# Patient Record
Sex: FEMALE | Race: WHITE | NOT HISPANIC OR LATINO
[De-identification: names, ages, dates, MRNs, and addresses within clinical notes are randomized per-mention and may not be internally consistent; named-entity substitution may affect disease eponyms.]

---

## 2017-08-03 ENCOUNTER — LABORATORY RESULT (OUTPATIENT)
Age: 82
End: 2017-08-03

## 2017-08-03 ENCOUNTER — APPOINTMENT (OUTPATIENT)
Dept: ENDOCRINOLOGY | Facility: CLINIC | Age: 82
End: 2017-08-03
Payer: MEDICARE

## 2017-08-03 VITALS
WEIGHT: 100 LBS | HEIGHT: 58.5 IN | SYSTOLIC BLOOD PRESSURE: 112 MMHG | HEART RATE: 74 BPM | DIASTOLIC BLOOD PRESSURE: 68 MMHG | BODY MASS INDEX: 20.43 KG/M2

## 2017-08-03 DIAGNOSIS — E04.2 NONTOXIC MULTINODULAR GOITER: ICD-10-CM

## 2017-08-03 DIAGNOSIS — E78.5 HYPERLIPIDEMIA, UNSPECIFIED: ICD-10-CM

## 2017-08-03 PROCEDURE — 36415 COLL VENOUS BLD VENIPUNCTURE: CPT

## 2017-08-03 PROCEDURE — 99214 OFFICE O/P EST MOD 30 MIN: CPT | Mod: 25

## 2017-08-03 RX ORDER — AMOXICILLIN AND CLAVULANATE POTASSIUM 500; 125 MG/1; MG/1
500-125 TABLET, FILM COATED ORAL
Qty: 20 | Refills: 0 | Status: ACTIVE | COMMUNITY
Start: 2017-07-10

## 2017-08-03 RX ORDER — TRAMADOL HYDROCHLORIDE 50 MG/1
50 TABLET, COATED ORAL
Qty: 120 | Refills: 0 | Status: ACTIVE | COMMUNITY
Start: 2017-02-15

## 2017-08-03 RX ORDER — NUT.TX.IMPAIRED DIGESTIVE FXN 0.035-1/ML
LIQUID (ML) ORAL
Refills: 0 | Status: ACTIVE | COMMUNITY

## 2017-08-03 RX ORDER — CLARITHROMYCIN 500 MG/1
500 TABLET, FILM COATED ORAL
Qty: 20 | Refills: 0 | Status: ACTIVE | COMMUNITY
Start: 2017-07-17

## 2017-08-08 LAB
T3 SERPL-MCNC: 107 NG/DL
T4 SERPL-MCNC: 9.1 UG/DL
TSH SERPL-ACNC: 0.6 UIU/ML

## 2018-02-13 ENCOUNTER — INPATIENT (INPATIENT)
Facility: HOSPITAL | Age: 83
LOS: 2 days | Discharge: NURSING, REHAB OR LONG-TERM CARE | End: 2018-02-16
Attending: HOSPITALIST | Admitting: HOSPITALIST
Payer: MEDICARE

## 2018-02-13 VITALS
RESPIRATION RATE: 18 BRPM | SYSTOLIC BLOOD PRESSURE: 121 MMHG | DIASTOLIC BLOOD PRESSURE: 68 MMHG | TEMPERATURE: 98 F | OXYGEN SATURATION: 97 % | HEART RATE: 74 BPM

## 2018-02-13 DIAGNOSIS — S32.591A OTHER SPECIFIED FRACTURE OF RIGHT PUBIS, INITIAL ENCOUNTER FOR CLOSED FRACTURE: ICD-10-CM

## 2018-02-13 LAB
ALBUMIN SERPL ELPH-MCNC: 4.7 G/DL — SIGNIFICANT CHANGE UP (ref 3.3–5)
ALP SERPL-CCNC: 51 U/L — SIGNIFICANT CHANGE UP (ref 40–120)
ALT FLD-CCNC: 24 U/L — SIGNIFICANT CHANGE UP (ref 4–33)
APPEARANCE UR: CLEAR — SIGNIFICANT CHANGE UP
APTT BLD: 29.7 SEC — SIGNIFICANT CHANGE UP (ref 27.5–37.4)
AST SERPL-CCNC: 28 U/L — SIGNIFICANT CHANGE UP (ref 4–32)
BASOPHILS # BLD AUTO: 0.05 K/UL — SIGNIFICANT CHANGE UP (ref 0–0.2)
BASOPHILS NFR BLD AUTO: 0.3 % — SIGNIFICANT CHANGE UP (ref 0–2)
BILIRUB SERPL-MCNC: 1.6 MG/DL — HIGH (ref 0.2–1.2)
BILIRUB UR-MCNC: NEGATIVE — SIGNIFICANT CHANGE UP
BLD GP AB SCN SERPL QL: NEGATIVE — SIGNIFICANT CHANGE UP
BLOOD UR QL VISUAL: NEGATIVE — SIGNIFICANT CHANGE UP
BUN SERPL-MCNC: 21 MG/DL — SIGNIFICANT CHANGE UP (ref 7–23)
CALCIUM SERPL-MCNC: 9 MG/DL — SIGNIFICANT CHANGE UP (ref 8.4–10.5)
CHLORIDE SERPL-SCNC: 101 MMOL/L — SIGNIFICANT CHANGE UP (ref 98–107)
CO2 SERPL-SCNC: 27 MMOL/L — SIGNIFICANT CHANGE UP (ref 22–31)
COLOR SPEC: SIGNIFICANT CHANGE UP
CREAT SERPL-MCNC: 0.97 MG/DL — SIGNIFICANT CHANGE UP (ref 0.5–1.3)
EOSINOPHIL # BLD AUTO: 0.03 K/UL — SIGNIFICANT CHANGE UP (ref 0–0.5)
EOSINOPHIL NFR BLD AUTO: 0.2 % — SIGNIFICANT CHANGE UP (ref 0–6)
GLUCOSE SERPL-MCNC: 114 MG/DL — HIGH (ref 70–99)
GLUCOSE UR-MCNC: NEGATIVE — SIGNIFICANT CHANGE UP
HCT VFR BLD CALC: 45.1 % — HIGH (ref 34.5–45)
HGB BLD-MCNC: 15.1 G/DL — SIGNIFICANT CHANGE UP (ref 11.5–15.5)
IMM GRANULOCYTES # BLD AUTO: 0.1 # — SIGNIFICANT CHANGE UP
IMM GRANULOCYTES NFR BLD AUTO: 0.6 % — SIGNIFICANT CHANGE UP (ref 0–1.5)
INR BLD: 0.94 — SIGNIFICANT CHANGE UP (ref 0.88–1.17)
KETONES UR-MCNC: SIGNIFICANT CHANGE UP
LEUKOCYTE ESTERASE UR-ACNC: SIGNIFICANT CHANGE UP
LYMPHOCYTES # BLD AUTO: 1.79 K/UL — SIGNIFICANT CHANGE UP (ref 1–3.3)
LYMPHOCYTES # BLD AUTO: 11.4 % — LOW (ref 13–44)
MCHC RBC-ENTMCNC: 33 PG — SIGNIFICANT CHANGE UP (ref 27–34)
MCHC RBC-ENTMCNC: 33.5 % — SIGNIFICANT CHANGE UP (ref 32–36)
MCV RBC AUTO: 98.7 FL — SIGNIFICANT CHANGE UP (ref 80–100)
MONOCYTES # BLD AUTO: 1.13 K/UL — HIGH (ref 0–0.9)
MONOCYTES NFR BLD AUTO: 7.2 % — SIGNIFICANT CHANGE UP (ref 2–14)
MUCOUS THREADS # UR AUTO: SIGNIFICANT CHANGE UP
NEUTROPHILS # BLD AUTO: 12.65 K/UL — HIGH (ref 1.8–7.4)
NEUTROPHILS NFR BLD AUTO: 80.3 % — HIGH (ref 43–77)
NITRITE UR-MCNC: NEGATIVE — SIGNIFICANT CHANGE UP
NON-SQ EPI CELLS # UR AUTO: <1 — SIGNIFICANT CHANGE UP
NRBC # FLD: 0 — SIGNIFICANT CHANGE UP
PH UR: 6.5 — SIGNIFICANT CHANGE UP (ref 4.6–8)
PLATELET # BLD AUTO: 204 K/UL — SIGNIFICANT CHANGE UP (ref 150–400)
PMV BLD: 10 FL — SIGNIFICANT CHANGE UP (ref 7–13)
POTASSIUM SERPL-MCNC: 4 MMOL/L — SIGNIFICANT CHANGE UP (ref 3.5–5.3)
POTASSIUM SERPL-SCNC: 4 MMOL/L — SIGNIFICANT CHANGE UP (ref 3.5–5.3)
PROT SERPL-MCNC: 7.2 G/DL — SIGNIFICANT CHANGE UP (ref 6–8.3)
PROT UR-MCNC: NEGATIVE MG/DL — SIGNIFICANT CHANGE UP
PROTHROM AB SERPL-ACNC: 10.4 SEC — SIGNIFICANT CHANGE UP (ref 9.8–13.1)
RBC # BLD: 4.57 M/UL — SIGNIFICANT CHANGE UP (ref 3.8–5.2)
RBC # FLD: 12.3 % — SIGNIFICANT CHANGE UP (ref 10.3–14.5)
RBC CASTS # UR COMP ASSIST: HIGH (ref 0–?)
RH IG SCN BLD-IMP: POSITIVE — SIGNIFICANT CHANGE UP
SODIUM SERPL-SCNC: 141 MMOL/L — SIGNIFICANT CHANGE UP (ref 135–145)
SP GR SPEC: 1.02 — SIGNIFICANT CHANGE UP (ref 1–1.04)
SQUAMOUS # UR AUTO: SIGNIFICANT CHANGE UP
UROBILINOGEN FLD QL: NORMAL MG/DL — SIGNIFICANT CHANGE UP
WBC # BLD: 15.75 K/UL — HIGH (ref 3.8–10.5)
WBC # FLD AUTO: 15.75 K/UL — HIGH (ref 3.8–10.5)
WBC UR QL: SIGNIFICANT CHANGE UP (ref 0–?)

## 2018-02-13 PROCEDURE — 71045 X-RAY EXAM CHEST 1 VIEW: CPT | Mod: 26

## 2018-02-13 PROCEDURE — 99223 1ST HOSP IP/OBS HIGH 75: CPT

## 2018-02-13 PROCEDURE — 72192 CT PELVIS W/O DYE: CPT | Mod: 26

## 2018-02-13 PROCEDURE — 73552 X-RAY EXAM OF FEMUR 2/>: CPT | Mod: 26,RT

## 2018-02-13 PROCEDURE — 76377 3D RENDER W/INTRP POSTPROCES: CPT | Mod: 26

## 2018-02-13 PROCEDURE — 73502 X-RAY EXAM HIP UNI 2-3 VIEWS: CPT | Mod: 26,RT

## 2018-02-13 RX ORDER — MORPHINE SULFATE 50 MG/1
2 CAPSULE, EXTENDED RELEASE ORAL EVERY 4 HOURS
Qty: 0 | Refills: 0 | Status: DISCONTINUED | OUTPATIENT
Start: 2018-02-13 | End: 2018-02-15

## 2018-02-13 RX ORDER — TRAMADOL HYDROCHLORIDE 50 MG/1
50 TABLET ORAL EVERY 6 HOURS
Qty: 0 | Refills: 0 | Status: DISCONTINUED | OUTPATIENT
Start: 2018-02-13 | End: 2018-02-13

## 2018-02-13 RX ORDER — MORPHINE SULFATE 50 MG/1
2 CAPSULE, EXTENDED RELEASE ORAL ONCE
Qty: 0 | Refills: 0 | Status: DISCONTINUED | OUTPATIENT
Start: 2018-02-13 | End: 2018-02-13

## 2018-02-13 RX ORDER — ATORVASTATIN CALCIUM 80 MG/1
1 TABLET, FILM COATED ORAL
Qty: 0 | Refills: 0 | COMMUNITY

## 2018-02-13 RX ORDER — TRAMADOL HYDROCHLORIDE 50 MG/1
50 TABLET ORAL EVERY 12 HOURS
Qty: 0 | Refills: 0 | Status: DISCONTINUED | OUTPATIENT
Start: 2018-02-13 | End: 2018-02-15

## 2018-02-13 RX ORDER — ENOXAPARIN SODIUM 100 MG/ML
40 INJECTION SUBCUTANEOUS EVERY 24 HOURS
Qty: 0 | Refills: 0 | Status: DISCONTINUED | OUTPATIENT
Start: 2018-02-13 | End: 2018-02-16

## 2018-02-13 RX ADMIN — MORPHINE SULFATE 2 MILLIGRAM(S): 50 CAPSULE, EXTENDED RELEASE ORAL at 17:45

## 2018-02-13 RX ADMIN — MORPHINE SULFATE 2 MILLIGRAM(S): 50 CAPSULE, EXTENDED RELEASE ORAL at 17:32

## 2018-02-13 RX ADMIN — MORPHINE SULFATE 2 MILLIGRAM(S): 50 CAPSULE, EXTENDED RELEASE ORAL at 21:11

## 2018-02-13 RX ADMIN — MORPHINE SULFATE 2 MILLIGRAM(S): 50 CAPSULE, EXTENDED RELEASE ORAL at 21:43

## 2018-02-13 RX ADMIN — ENOXAPARIN SODIUM 40 MILLIGRAM(S): 100 INJECTION SUBCUTANEOUS at 23:23

## 2018-02-13 NOTE — ED PROVIDER NOTE - ATTENDING CONTRIBUTION TO CARE
Pt was seen and evaluated by me. Pt states she has a history of arthritis and osteoporosis presents to ED for right hip pain s/p fall. Pt states she normally walks with a cane and tripped over the sidewalk getting out of the car and fell on to her right side. Pt denies hitting her head or any LOC. Pt notes having right hip pain. Pt was seen at [facility] and found to have a right superior and inferior rami fx. Pt was unable to ambulate since. Pt was given Tramadol at the [facility]. Pt denies any headache, neck pain, back pain, fever, chills, nausea, vomiting, SOB, chest pain, or abd pain. Lungs CTA b/l. RRR. Abd soft, non-tender. Tender to right hip area with inability to raise right leg. + distal pulses. No midline neck or back tenderness.

## 2018-02-13 NOTE — CHART NOTE - NSCHARTNOTEFT_GEN_A_CORE
Reason for consult  Pubic Rami Fx.     HPI:  89 yo f pmh HLD, constipation, osteoarthritis, scoliosis, osteoporosis had a mechanical fall.  She was outside ambulating with a cane, she was inbetween grass and concrete, felt an imbalance and fell on her bottom.  She denies LOC and denies dizziness before falling.  In ED imaging showed a pubic rami fx.  In the ED, received 2mg of Morphine for pain.  Ortho saw pt and recommended no surgical intervention.     Xrays prelim read said Acute avulsion fracture involving the right infrapubic rami.   Additional cortical lucency through the right superior pubic rami also concerning for fracture. Correlate with pending CT pelvis.      Medications  Tramadol 50mg qdaily PRN  Atorvastatin 50mg for HLD  ebonate 150mg once a month took it feb 1st  Vitd 1000 IU  Calcium 600mg   folic acid  Miralax PRN    NKDA    REVIEW OF SYSTEMS:    CONSTITUTIONAL: No fever, weight loss, or fatigue  EYES: No eye pain, visual disturbances, or discharge  ENMT:  +difficulty hearing(chronic), no tinnitus, vertigo; No sinus or throat pain  NECK: No pain or stiffness  RESPIRATORY: No cough, wheezing, chills or hemoptysis; No shortness of breath  CARDIOVASCULAR: No chest pain, palpitations, dizziness, or leg swelling  GASTROINTESTINAL: No abdominal or epigastric pain. No nausea, vomiting, or hematemesis; No diarrhea, mild constipation. No melena or hematochezia.  GENITOURINARY: No dysuria, frequency, hematuria, or incontinence  NEUROLOGICAL: No headaches, memory loss, loss of strength, numbness, or tremors  SKIN: No itching, burning, rashes, or lesions   MUSCULOSKELETAL: Right side of the hip hurts.  Hurts when she moves.        PAST MEDICAL HISTORY:  Osteoporosis  Arthritis  HLD  scoliosis      Allergies  No Known Allergies    PHYSICAL EXAM:    Vital Signs Last 24 Hrs  T(C): 36.5 (13 Feb 2018 17:48), Max: 36.5 (13 Feb 2018 15:17)  T(F): 97.7 (13 Feb 2018 17:48), Max: 97.7 (13 Feb 2018 15:17)  HR: 53 (13 Feb 2018 17:48) (53 - 74)  BP: 135/69 (13 Feb 2018 17:48) (121/68 - 135/69)  BP(mean): --  RR: 18 (13 Feb 2018 17:48) (18 - 18)  SpO2: 97% (13 Feb 2018 17:48) (97% - 97%)    Gen: AandO x 3, NAD  no swelling or pitting edema b/l  unable to move right lower ext 2/2 pain                        15.1   15.75 )-----------( 204      ( 13 Feb 2018 17:17 )             45.1       02-13    141  |  101  |  21  ----------------------------<  114<H>  4.0   |  27  |  0.97    Ca    9.0      13 Feb 2018 17:17    TPro  7.2  /  Alb  4.7  /  TBili  1.6<H>  /  DBili  x   /  AST  28  /  ALT  24  /  AlkPhos  51  02-13    xrays as above      A/P  89 yo f pmh osteoporosis being admitted for Pubic Rami fx  Pain management with morphine PRN  ortho recs appreciated  PT eval and treat  Likely will need Rehab    Leukocytosis  -likely reactive  -repeat cbc AM    HLD  continue atorvastatin  call pharmacy to confirm dose    Osteoporosis  call pharmacy to confirm dose of medications    DVT prophylaxis  Lovenox    D/W  Reason for consult  Pubic Rami Fx.     HPI:  89 yo f pmh HLD, constipation, osteoarthritis, scoliosis, osteoporosis had a mechanical fall.  She was outside ambulating with a cane, she was inbetween grass and concrete, felt an imbalance and fell on her bottom.  She denies LOC and denies dizziness before falling.  In ED imaging showed a pubic rami fx.  In the ED, received 2mg of Morphine for pain.  Ortho saw pt and recommended no surgical intervention.     Xrays prelim read said Acute avulsion fracture involving the right infrapubic rami.   Additional cortical lucency through the right superior pubic rami also concerning for fracture. Correlate with pending CT pelvis.      Medications  Tramadol 50mg qdaily PRN  Atorvastatin 50mg for HLD  ebonate 150mg once a month took it feb 1st  Vitd 1000 IU  Calcium 600mg   folic acid  Miralax PRN    NKDA    REVIEW OF SYSTEMS:    CONSTITUTIONAL: No fever, weight loss, or fatigue  EYES: No eye pain, visual disturbances, or discharge  ENMT:  +difficulty hearing(chronic), no tinnitus, vertigo; No sinus or throat pain  NECK: No pain or stiffness  RESPIRATORY: No cough, wheezing, chills or hemoptysis; No shortness of breath  CARDIOVASCULAR: No chest pain, palpitations, dizziness, or leg swelling  GASTROINTESTINAL: No abdominal or epigastric pain. No nausea, vomiting, or hematemesis; No diarrhea, mild constipation. No melena or hematochezia.  GENITOURINARY: No dysuria, frequency, hematuria, or incontinence  NEUROLOGICAL: No headaches, memory loss, loss of strength, numbness, or tremors  SKIN: No itching, burning, rashes, or lesions   MUSCULOSKELETAL: Right side of the hip hurts.  Hurts when she moves.        PAST MEDICAL HISTORY:  Osteoporosis  Arthritis  HLD  scoliosis      Allergies  No Known Allergies    PHYSICAL EXAM:    Vital Signs Last 24 Hrs  T(C): 36.5 (13 Feb 2018 17:48), Max: 36.5 (13 Feb 2018 15:17)  T(F): 97.7 (13 Feb 2018 17:48), Max: 97.7 (13 Feb 2018 15:17)  HR: 53 (13 Feb 2018 17:48) (53 - 74)  BP: 135/69 (13 Feb 2018 17:48) (121/68 - 135/69)  BP(mean): --  RR: 18 (13 Feb 2018 17:48) (18 - 18)  SpO2: 97% (13 Feb 2018 17:48) (97% - 97%)    Gen: AandO x 3, NAD  no swelling or pitting edema b/l  unable to move right lower ext 2/2 pain                        15.1   15.75 )-----------( 204      ( 13 Feb 2018 17:17 )             45.1       02-13    141  |  101  |  21  ----------------------------<  114<H>  4.0   |  27  |  0.97    Ca    9.0      13 Feb 2018 17:17    TPro  7.2  /  Alb  4.7  /  TBili  1.6<H>  /  DBili  x   /  AST  28  /  ALT  24  /  AlkPhos  51  02-13    xrays as above      A/P  89 yo f pmh osteoporosis being admitted for Pubic Rami fx  Pain management with morphine PRN  ortho recs appreciated  PT eval and treat  Likely will need Rehab  F/U CT    Leukocytosis  -likely reactive  -repeat cbc AM    HLD  continue atorvastatin  call pharmacy to confirm dose    Osteoporosis  call pharmacy to confirm dose of medications    DVT prophylaxis  Lovenox    Discussed case with Cindi the PA.

## 2018-02-13 NOTE — ED PROVIDER NOTE - OBJECTIVE STATEMENT
89 y/o female with PMHx of Arthritis and Osteoporosis presents to ED for right hip pain s/p fall. Pt states she normally walks with a cane and was getting out of the car and fell on to his right side. Pt denies hitting her head or any LOC. Pt notes having right hip pain and was able to walk into [facility]. Pt was found to have a right superior and inferior pubic rami fracture and has since been unable to ambulate. Pt denies any headache, neck pain, back pain, fever, chills, nausea, vomiting, SOB, chest pain, or abd pain.

## 2018-02-13 NOTE — ED ADULT NURSE REASSESSMENT NOTE - NS ED NURSE REASSESS COMMENT FT1
pt c/o pain with movement to pelvis and right hip. pt medicated with morphine for pain, safety maintained. pt admitted into hospital evaluated by ortho team.

## 2018-02-13 NOTE — ED ADULT NURSE NOTE - OBJECTIVE STATEMENT
received pt to room 23 for evaluation of pelvic fracture, right hip pain s/p trip and fall today over the curb. pt presents [place], awake a&ox4, denies dizziness or ha. skin warm, dry, appropriate for race. respirations even unlabored. denies cp or sob. abdomen soft nontender, nondistended, BS+x4, denies n/v/d. denies fevers or chills. right leg shortened, + pedal pulses b/l. ivl placed bloods drawn and sent. medicated as ordered. daughter at bedside. received pt to room 23 for evaluation of pelvic fracture, right hip pain s/p trip and fall today over the curb. pt presents [place], awake a&ox4, denies dizziness or ha. skin warm, dry, appropriate for race. respirations even unlabored. denies cp or sob. abdomen soft nontender, nondistended, BS+x4, denies n/v/d. denies fevers or chills. right leg shortened, minimally externally rotated. neurovascular/sensory intact. + pedal pulses b/l. ivl placed bloods drawn and sent. medicated as ordered. daughter at bedside.

## 2018-02-13 NOTE — H&P ADULT - HISTORY OF PRESENT ILLNESS
89 y.o. woman with history of OA of shoulders, and osteoporosis who was brought to the ER for evaluation of right hip pain following mechanical fall. Patient states that she was exiting a cab when she fell on her right side. She reported severe pain with ambulation thereafter and was brought to an urgent care center by her daughter. Patient was later transferred to the ER for further evaluation. patient had pelvic x-ray which showed pubic ramus fracture. As per orthopedic surgery evaluation, no surgical intervention was warranted at this time, therefore, she was admitted to the medicine service for further care. At present, patient reports no pain, however, she reports severe pain with any movement of the right thigh. No reports of chest pain or palpitation prior to her fall. No reports of LOC before, during, or after her fall. No other complaints at present.

## 2018-02-13 NOTE — ED PROVIDER NOTE - PROGRESS NOTE DETAILS
Gollogly: pt seen by ortho - no surgery. Will admit to medicine for PT/rehab placement. D/w pt and pt's daughter. Gave additional pain meds.

## 2018-02-13 NOTE — ED ADULT NURSE REASSESSMENT NOTE - NS ED NURSE REASSESS COMMENT FT1
pt resting, awaiting radiology results at this time. safety maintained, daughter remains at bedside. report to nightshift nurse Edwards.

## 2018-02-13 NOTE — H&P ADULT - PROBLEM SELECTOR PLAN 1
- Pain control  - Fall risk precaution  - Fracture precaution  - Bed alarm  - PT evaluation   - Orthopedic surgery consult appreciated

## 2018-02-13 NOTE — ED ADULT TRIAGE NOTE - PAIN RATING/NUMBER SCALE (0-10): ACTIVITY
3 Purse String (Intermediate) Text: Given the location of the defect and the characteristics of the surrounding skin a pursestring intermediate closure was deemed most appropriate.  Undermining was performed circumfirentially around the surgical defect.  A purstring suture was then placed and tightened.

## 2018-02-13 NOTE — H&P ADULT - NSHPPHYSICALEXAM_GEN_ALL_CORE
Vital Signs Last 24 Hrs  T(C): 37 (13 Feb 2018 22:30), Max: 37 (13 Feb 2018 22:30)  T(F): 98.6 (13 Feb 2018 22:30), Max: 98.6 (13 Feb 2018 22:30)  HR: 85 (13 Feb 2018 22:30) (53 - 85)  BP: 105/65 (13 Feb 2018 22:30) (105/65 - 135/69)  BP(mean): --  RR: 18 (13 Feb 2018 22:30) (16 - 18)  SpO2: 98% (13 Feb 2018 22:30) (97% - 98%)

## 2018-02-13 NOTE — ED ADULT TRIAGE NOTE - CHIEF COMPLAINT QUOTE
pt BIBA from urgent care.  pt fell today and was found to have a right pelvis fracture.  + shortening noted to RLE

## 2018-02-13 NOTE — CONSULT NOTE ADULT - SUBJECTIVE AND OBJECTIVE BOX
89y Female community ambulator with cane presents c/o R hip pain and inability to ambulate sp mechanical fall. She was getting out of her car when her cane went down in mud and she lots her balance and fell onto her right side. She was helped up and able to walk on her own to the urgent care center. When she tried to leave urgent care she was too sore and unable to bear weight on her right lower extremity. Denies HS/LOC. Denies numbness/tingling. Denies fever/chills. Denies pain/injury elsewhere.     PAST MEDICAL & SURGICAL HISTORY:  Osteoporosis  Arthritis  No significant past surgical history    MEDICATIONS  (STANDING):  morphine  - Injectable 2 milliGRAM(s) IV Push once    Allergies    No Known Allergies    Intolerances                              15.1   15.75 )-----------( 204      ( 13 Feb 2018 17:17 )             45.1     13 Feb 2018 17:17    141    |  101    |  21     ----------------------------<  114    4.0     |  27     |  0.97     Ca    9.0        13 Feb 2018 17:17    TPro  7.2    /  Alb  4.7    /  TBili  1.6    /  DBili  x      /  AST  28     /  ALT  24     /  AlkPhos  51     13 Feb 2018 17:17    PT/INR - ( 13 Feb 2018 17:17 )   PT: 10.4 SEC;   INR: 0.94          PTT - ( 13 Feb 2018 17:17 )  PTT:29.7 SEC  Vital Signs Last 24 Hrs  T(C): 36.5 (02-13-18 @ 17:48), Max: 36.5 (02-13-18 @ 15:17)  T(F): 97.7 (02-13-18 @ 17:48), Max: 97.7 (02-13-18 @ 15:17)  HR: 53 (02-13-18 @ 17:48) (53 - 74)  BP: 135/69 (02-13-18 @ 17:48) (121/68 - 135/69)  BP(mean): --  RR: 18 (02-13-18 @ 17:48) (18 - 18)  SpO2: 97% (02-13-18 @ 17:48) (97% - 97%)    Imaging: XR/CT personally reviewed and demonstrates R inf/sup pubic rami fracture    Physical Exam  Gen: NAD  RLE: skin intact, no lld, unable to SLR, negative heel strike, mild pain with log roll, +ttp over pubic rami/symphysis, no ttp elsewhere, +ehl/fhl/ta/gs function, no calf ttp, dp/pt pulse intact, compartments soft    Secondary Survey: Full ROM of unaffected extremities, SILT globally, compartments soft, no bony TTP over bony prominences, no calf TTP, able to SLR with contralateral leg, no TTP along axial spine, able to SLR contralateral leg, negative log roll contralateral leg    A/P: 89y Female with pubic rami fracture  Pain control  WBAT  Ca/Vit D  Outpt osteoporosis workup  Admit to medical team for pain control/PT  no orthopedic surgical intervention at this time  patient to follow up with Dr. Salmeron as outpatient [phone]

## 2018-02-13 NOTE — ED PROVIDER NOTE - MEDICAL DECISION MAKING DETAILS
89 y/o female with right hip pain s/p fall and superior and inferior pubic rami fx found on X-ray and [facility]. Unable to ambulate. CT pelvis. Analgesia.

## 2018-02-14 DIAGNOSIS — S32.591A OTHER SPECIFIED FRACTURE OF RIGHT PUBIS, INITIAL ENCOUNTER FOR CLOSED FRACTURE: ICD-10-CM

## 2018-02-14 DIAGNOSIS — Z98.890 OTHER SPECIFIED POSTPROCEDURAL STATES: Chronic | ICD-10-CM

## 2018-02-14 DIAGNOSIS — M19.90 UNSPECIFIED OSTEOARTHRITIS, UNSPECIFIED SITE: ICD-10-CM

## 2018-02-14 DIAGNOSIS — M80.00XA AGE-RELATED OSTEOPOROSIS WITH CURRENT PATHOLOGICAL FRACTURE, UNSPECIFIED SITE, INITIAL ENCOUNTER FOR FRACTURE: ICD-10-CM

## 2018-02-14 DIAGNOSIS — E78.5 HYPERLIPIDEMIA, UNSPECIFIED: ICD-10-CM

## 2018-02-14 DIAGNOSIS — Z29.9 ENCOUNTER FOR PROPHYLACTIC MEASURES, UNSPECIFIED: ICD-10-CM

## 2018-02-14 LAB
BASOPHILS # BLD AUTO: 0.03 K/UL — SIGNIFICANT CHANGE UP (ref 0–0.2)
BASOPHILS NFR BLD AUTO: 0.3 % — SIGNIFICANT CHANGE UP (ref 0–2)
BUN SERPL-MCNC: 20 MG/DL — SIGNIFICANT CHANGE UP (ref 7–23)
CALCIUM SERPL-MCNC: 8.4 MG/DL — SIGNIFICANT CHANGE UP (ref 8.4–10.5)
CHLORIDE SERPL-SCNC: 102 MMOL/L — SIGNIFICANT CHANGE UP (ref 98–107)
CO2 SERPL-SCNC: 27 MMOL/L — SIGNIFICANT CHANGE UP (ref 22–31)
CREAT SERPL-MCNC: 0.85 MG/DL — SIGNIFICANT CHANGE UP (ref 0.5–1.3)
EOSINOPHIL # BLD AUTO: 0.04 K/UL — SIGNIFICANT CHANGE UP (ref 0–0.5)
EOSINOPHIL NFR BLD AUTO: 0.4 % — SIGNIFICANT CHANGE UP (ref 0–6)
GLUCOSE SERPL-MCNC: 107 MG/DL — HIGH (ref 70–99)
HCT VFR BLD CALC: 39 % — SIGNIFICANT CHANGE UP (ref 34.5–45)
HGB BLD-MCNC: 13.2 G/DL — SIGNIFICANT CHANGE UP (ref 11.5–15.5)
IMM GRANULOCYTES # BLD AUTO: 0.04 # — SIGNIFICANT CHANGE UP
IMM GRANULOCYTES NFR BLD AUTO: 0.4 % — SIGNIFICANT CHANGE UP (ref 0–1.5)
LYMPHOCYTES # BLD AUTO: 2.32 K/UL — SIGNIFICANT CHANGE UP (ref 1–3.3)
LYMPHOCYTES # BLD AUTO: 24 % — SIGNIFICANT CHANGE UP (ref 13–44)
MAGNESIUM SERPL-MCNC: 1.9 MG/DL — SIGNIFICANT CHANGE UP (ref 1.6–2.6)
MCHC RBC-ENTMCNC: 33.1 PG — SIGNIFICANT CHANGE UP (ref 27–34)
MCHC RBC-ENTMCNC: 33.8 % — SIGNIFICANT CHANGE UP (ref 32–36)
MCV RBC AUTO: 97.7 FL — SIGNIFICANT CHANGE UP (ref 80–100)
MONOCYTES # BLD AUTO: 1.13 K/UL — HIGH (ref 0–0.9)
MONOCYTES NFR BLD AUTO: 11.7 % — SIGNIFICANT CHANGE UP (ref 2–14)
NEUTROPHILS # BLD AUTO: 6.1 K/UL — SIGNIFICANT CHANGE UP (ref 1.8–7.4)
NEUTROPHILS NFR BLD AUTO: 63.2 % — SIGNIFICANT CHANGE UP (ref 43–77)
NRBC # FLD: 0 — SIGNIFICANT CHANGE UP
PHOSPHATE SERPL-MCNC: 2.9 MG/DL — SIGNIFICANT CHANGE UP (ref 2.5–4.5)
PLATELET # BLD AUTO: 189 K/UL — SIGNIFICANT CHANGE UP (ref 150–400)
PMV BLD: 10.5 FL — SIGNIFICANT CHANGE UP (ref 7–13)
POTASSIUM SERPL-MCNC: 4.1 MMOL/L — SIGNIFICANT CHANGE UP (ref 3.5–5.3)
POTASSIUM SERPL-SCNC: 4.1 MMOL/L — SIGNIFICANT CHANGE UP (ref 3.5–5.3)
RBC # BLD: 3.99 M/UL — SIGNIFICANT CHANGE UP (ref 3.8–5.2)
RBC # FLD: 12.2 % — SIGNIFICANT CHANGE UP (ref 10.3–14.5)
SODIUM SERPL-SCNC: 141 MMOL/L — SIGNIFICANT CHANGE UP (ref 135–145)
WBC # BLD: 9.66 K/UL — SIGNIFICANT CHANGE UP (ref 3.8–10.5)
WBC # FLD AUTO: 9.66 K/UL — SIGNIFICANT CHANGE UP (ref 3.8–10.5)

## 2018-02-14 PROCEDURE — 99233 SBSQ HOSP IP/OBS HIGH 50: CPT

## 2018-02-14 PROCEDURE — 99233 SBSQ HOSP IP/OBS HIGH 50: CPT | Mod: GC

## 2018-02-14 RX ORDER — ATORVASTATIN CALCIUM 80 MG/1
20 TABLET, FILM COATED ORAL AT BEDTIME
Qty: 0 | Refills: 0 | Status: DISCONTINUED | OUTPATIENT
Start: 2018-02-14 | End: 2018-02-16

## 2018-02-14 RX ORDER — POLYETHYLENE GLYCOL 3350 17 G/17G
17 POWDER, FOR SOLUTION ORAL ONCE
Qty: 0 | Refills: 0 | Status: COMPLETED | OUTPATIENT
Start: 2018-02-14 | End: 2018-02-14

## 2018-02-14 RX ORDER — IBANDRONATE SODIUM 150 MG/1
0 TABLET ORAL
Qty: 0 | Refills: 0 | COMMUNITY

## 2018-02-14 RX ORDER — FOLIC ACID 0.8 MG
1 TABLET ORAL DAILY
Qty: 0 | Refills: 0 | Status: DISCONTINUED | OUTPATIENT
Start: 2018-02-14 | End: 2018-02-16

## 2018-02-14 RX ORDER — FOLIC ACID 0.8 MG
1 TABLET ORAL
Qty: 0 | Refills: 0 | COMMUNITY

## 2018-02-14 RX ADMIN — ATORVASTATIN CALCIUM 20 MILLIGRAM(S): 80 TABLET, FILM COATED ORAL at 21:21

## 2018-02-14 RX ADMIN — Medication 1 MILLIGRAM(S): at 11:25

## 2018-02-14 RX ADMIN — TRAMADOL HYDROCHLORIDE 50 MILLIGRAM(S): 50 TABLET ORAL at 12:20

## 2018-02-14 RX ADMIN — Medication 1 TABLET(S): at 11:25

## 2018-02-14 RX ADMIN — POLYETHYLENE GLYCOL 3350 17 GRAM(S): 17 POWDER, FOR SOLUTION ORAL at 21:21

## 2018-02-14 RX ADMIN — TRAMADOL HYDROCHLORIDE 50 MILLIGRAM(S): 50 TABLET ORAL at 11:42

## 2018-02-14 RX ADMIN — ENOXAPARIN SODIUM 40 MILLIGRAM(S): 100 INJECTION SUBCUTANEOUS at 21:21

## 2018-02-14 NOTE — PHYSICAL THERAPY INITIAL EVALUATION ADULT - GAIT DEVIATIONS NOTED, PT EVAL
decreased weight-shifting ability/decreased beatris/decreased velocity of limb motion/decreased step length

## 2018-02-14 NOTE — PHYSICAL THERAPY INITIAL EVALUATION ADULT - ACTIVE RANGE OF MOTION EXAMINATION, REHAB EVAL
bilateral upper extremity Active ROM was WFL (within functional limits)/bilateral  lower extremity Active ROM was WFL (within functional limits)/except R hip flexion 0- 50 degrees due to pain limiting

## 2018-02-14 NOTE — PHYSICAL THERAPY INITIAL EVALUATION ADULT - PERTINENT HX OF CURRENT PROBLEM, REHAB EVAL
This is a 89 y.o. woman with OA and osteoporosis now with right pubic ramus fracture s/p fall to right side.

## 2018-02-14 NOTE — PROGRESS NOTE ADULT - PROBLEM SELECTOR PLAN 1
- Pain control  - Fall risk precaution  - Fracture precaution  - Bed alarm  - PT evaluation noted , recommend rehab    - Orthopedic surgery consult appreciated, no surgical intervention, PT/OT, WBAT - Pain control  - Fall risk precaution  - Fracture precaution  - Bed alarm  - PT evaluation noted , recommend rehab    - Orthopedic surgery consult appreciated, no surgical intervention, PT/OT, WBAT  -patient to follow up with Dr. Salmeron as outpatient [phone]

## 2018-02-14 NOTE — PHYSICAL THERAPY INITIAL EVALUATION ADULT - GENERAL OBSERVATIONS, REHAB EVAL
Patient received semi supine in bed, (+) [place] ,daughter and son at bedside with the patient.

## 2018-02-15 PROCEDURE — 99232 SBSQ HOSP IP/OBS MODERATE 35: CPT

## 2018-02-15 RX ORDER — DOCUSATE SODIUM 100 MG
100 CAPSULE ORAL THREE TIMES A DAY
Qty: 0 | Refills: 0 | Status: DISCONTINUED | OUTPATIENT
Start: 2018-02-15 | End: 2018-02-16

## 2018-02-15 RX ORDER — MORPHINE SULFATE 50 MG/1
2 CAPSULE, EXTENDED RELEASE ORAL EVERY 4 HOURS
Qty: 0 | Refills: 0 | Status: DISCONTINUED | OUTPATIENT
Start: 2018-02-15 | End: 2018-02-16

## 2018-02-15 RX ORDER — GLYCERIN ADULT
1 SUPPOSITORY, RECTAL RECTAL ONCE
Qty: 0 | Refills: 0 | Status: COMPLETED | OUTPATIENT
Start: 2018-02-15 | End: 2018-02-15

## 2018-02-15 RX ORDER — POLYETHYLENE GLYCOL 3350 17 G/17G
17 POWDER, FOR SOLUTION ORAL DAILY
Qty: 0 | Refills: 0 | Status: DISCONTINUED | OUTPATIENT
Start: 2018-02-15 | End: 2018-02-16

## 2018-02-15 RX ORDER — TRAMADOL HYDROCHLORIDE 50 MG/1
50 TABLET ORAL EVERY 12 HOURS
Qty: 0 | Refills: 0 | Status: DISCONTINUED | OUTPATIENT
Start: 2018-02-15 | End: 2018-02-16

## 2018-02-15 RX ADMIN — Medication 1 TABLET(S): at 12:19

## 2018-02-15 RX ADMIN — TRAMADOL HYDROCHLORIDE 50 MILLIGRAM(S): 50 TABLET ORAL at 13:00

## 2018-02-15 RX ADMIN — ATORVASTATIN CALCIUM 20 MILLIGRAM(S): 80 TABLET, FILM COATED ORAL at 22:35

## 2018-02-15 RX ADMIN — Medication 1 SUPPOSITORY(S): at 12:21

## 2018-02-15 RX ADMIN — Medication 100 MILLIGRAM(S): at 22:36

## 2018-02-15 RX ADMIN — TRAMADOL HYDROCHLORIDE 50 MILLIGRAM(S): 50 TABLET ORAL at 12:23

## 2018-02-15 RX ADMIN — Medication 1 MILLIGRAM(S): at 12:19

## 2018-02-15 RX ADMIN — ENOXAPARIN SODIUM 40 MILLIGRAM(S): 100 INJECTION SUBCUTANEOUS at 22:35

## 2018-02-15 NOTE — PROGRESS NOTE ADULT - PROBLEM SELECTOR PLAN 1
- Pain control  - Fall risk precaution  - Fracture precaution  - Bed alarm  - PT evaluation noted , recommend rehab    - Orthopedic surgery consult appreciated, no surgical intervention, PT/OT, WBAT  -patient to follow up with Dr. Salmeron as outpatient [phone]

## 2018-02-16 ENCOUNTER — TRANSCRIPTION ENCOUNTER (OUTPATIENT)
Age: 83
End: 2018-02-16

## 2018-02-16 VITALS
OXYGEN SATURATION: 97 % | TEMPERATURE: 98 F | SYSTOLIC BLOOD PRESSURE: 104 MMHG | HEART RATE: 72 BPM | RESPIRATION RATE: 18 BRPM | DIASTOLIC BLOOD PRESSURE: 66 MMHG

## 2018-02-16 PROCEDURE — 99239 HOSP IP/OBS DSCHRG MGMT >30: CPT

## 2018-02-16 RX ORDER — DOCUSATE SODIUM 100 MG
1 CAPSULE ORAL
Qty: 0 | Refills: 0 | COMMUNITY
Start: 2018-02-16

## 2018-02-16 RX ORDER — POLYETHYLENE GLYCOL 3350 17 G/17G
17 POWDER, FOR SOLUTION ORAL
Qty: 0 | Refills: 0 | COMMUNITY
Start: 2018-02-16

## 2018-02-16 RX ORDER — TRAMADOL HYDROCHLORIDE 50 MG/1
1 TABLET ORAL
Qty: 0 | Refills: 0 | COMMUNITY

## 2018-02-16 RX ORDER — TRAMADOL HYDROCHLORIDE 50 MG/1
1 TABLET ORAL
Qty: 0 | Refills: 0 | COMMUNITY
Start: 2018-02-16

## 2018-02-16 RX ADMIN — Medication 100 MILLIGRAM(S): at 05:54

## 2018-02-16 RX ADMIN — TRAMADOL HYDROCHLORIDE 50 MILLIGRAM(S): 50 TABLET ORAL at 11:10

## 2018-02-16 RX ADMIN — TRAMADOL HYDROCHLORIDE 50 MILLIGRAM(S): 50 TABLET ORAL at 10:37

## 2018-02-16 NOTE — DISCHARGE NOTE ADULT - SECONDARY DIAGNOSIS.
Age-related osteoporosis with current pathological fracture, initial encounter Hyperlipidemia, unspecified hyperlipidemia type Arthritis

## 2018-02-16 NOTE — PROGRESS NOTE ADULT - ATTENDING COMMENTS
DC planning to rehab today  Patient hemodynamically stable for discharge  to rehab
Pt needs 3 night stay to be discharged to rehab  plan of care was discussed with daughter in the morning rounds and granddaughter (in the evenings rounds ) at bedside  DC planning to rehab in am
Pt needs 3 night stay to be discharged to rehab  plan of care was discussed with daughter at bedside

## 2018-02-16 NOTE — PROGRESS NOTE ADULT - PROBLEM SELECTOR PROBLEM 1
Pubic ramus fracture, right, closed, initial encounter

## 2018-02-16 NOTE — PROGRESS NOTE ADULT - SUBJECTIVE AND OBJECTIVE BOX
Patient is a 89y old  Female who presents with a chief complaint of Right hip pain (16 Feb 2018 09:20)      SUBJECTIVE / OVERNIGHT EVENTS: patient seen and examined by bedside at 9:30 Am, pt moved BM yesterday , has pain in lower back but controlled       MEDICATIONS  (STANDING):  atorvastatin 20 milliGRAM(s) Oral at bedtime  calcium carbonate 1250 mG + Vitamin D (OsCal 500 + D) 1 Tablet(s) Oral daily  docusate sodium 100 milliGRAM(s) Oral three times a day  enoxaparin Injectable 40 milliGRAM(s) SubCutaneous every 24 hours  folic acid 1 milliGRAM(s) Oral daily  multivitamin 1 Tablet(s) Oral daily  polyethylene glycol 3350 17 Gram(s) Oral daily    MEDICATIONS  (PRN):  morphine  - Injectable 2 milliGRAM(s) IV Push every 4 hours PRN Moderate Pain (4 - 6)  traMADol 50 milliGRAM(s) Oral every 12 hours PRN Mild Pain (1 - 3)      Vital Signs Last 24 Hrs  T(C): 36.8 (16 Feb 2018 05:54), Max: 36.9 (15 Feb 2018 21:20)  T(F): 98.2 (16 Feb 2018 05:54), Max: 98.4 (15 Feb 2018 21:20)  HR: 72 (16 Feb 2018 05:54) (72 - 77)  BP: 104/66 (16 Feb 2018 05:54) (104/66 - 123/65)  BP(mean): --  RR: 18 (16 Feb 2018 05:54) (18 - 18)  SpO2: 97% (16 Feb 2018 05:54) (97% - 98%)  CAPILLARY BLOOD GLUCOSE        I&O's Summary  PHYSICAL EXAM:  GENERAL: NAD, well-developed  HEAD:  Atraumatic, Normocephalic  EYES: EOMI, PERRLA, conjunctiva and sclera clear  NECK: Supple,   CHEST/LUNG: Clear to auscultation bilaterally; No wheeze  HEART: Regular rate and rhythm;  ABDOMEN: Soft, Nontender, Nondistended; Bowel sounds present  EXTREMITIES:  2+ Peripheral Pulses, No clubbing, cyanosis, or edema  PSYCH: AAOx3  NEUROLOGY: non-focal  SKIN: No rashes or lesions        LABS:      no new labs               RADIOLOGY & ADDITIONAL TESTS:    Imaging Personally Reviewed:    Consultant(s) Notes Reviewed:      Care Discussed with Consultants/Other Providers:
Patient is a 89y old  Female who presents with a chief complaint of Right hip pain (2018 23:29)      SUBJECTIVE / OVERNIGHT EVENTS: patient seen and examined by bedside at 9;25 Am, c/o pain in lower back, , denies numbness, tingling , disturbance with bowel and blader . Pt requesting for glycerin suppository       MEDICATIONS  (STANDING):  atorvastatin 20 milliGRAM(s) Oral at bedtime  calcium carbonate 1250 mG + Vitamin D (OsCal 500 + D) 1 Tablet(s) Oral daily  enoxaparin Injectable 40 milliGRAM(s) SubCutaneous every 24 hours  folic acid 1 milliGRAM(s) Oral daily  multivitamin 1 Tablet(s) Oral daily    MEDICATIONS  (PRN):  morphine  - Injectable 2 milliGRAM(s) IV Push every 4 hours PRN Moderate Pain (4 - 6)  traMADol 50 milliGRAM(s) Oral every 12 hours PRN Mild Pain (1 - 3)      Vital Signs Last 24 Hrs  T(C): 36.9 (15 Feb 2018 14:20), Max: 37.2 (2018 21:38)  T(F): 98.5 (15 Feb 2018 14:20), Max: 99 (2018 21:38)  HR: 81 (15 Feb 2018 14:20) (71 - 81)  BP: 117/63 (15 Feb 2018 14:20) (117/63 - 133/76)  BP(mean): --  RR: 18 (15 Feb 2018 14:20) (17 - 18)  SpO2: 99% (15 Feb 2018 14:20) (99% - 100%)  CAPILLARY BLOOD GLUCOSE        I&O's Summary    2018 07:01  -  15 Feb 2018 07:00  --------------------------------------------------------  IN: 130 mL / OUT: 200 mL / NET: -70 mL      PHYSICAL EXAM:  GENERAL: NAD, well-developed  HEAD:  Atraumatic, Normocephalic  EYES: EOMI, PERRLA, conjunctiva and sclera clear  NECK: Supple,   CHEST/LUNG: Clear to auscultation bilaterally; No wheeze  HEART: Regular rate and rhythm;  ABDOMEN: Soft, Nontender, Nondistended; Bowel sounds present  EXTREMITIES:  2+ Peripheral Pulses, No clubbing, cyanosis, or edema  PSYCH: AAOx3  NEUROLOGY: non-focal  SKIN: No rashes or lesions      LABS:                        13.2   9.66  )-----------( 189      ( 2018 06:27 )             39.0     02-14    141  |  102  |  20  ----------------------------<  107<H>  4.1   |  27  |  0.85    Ca    8.4      2018 06:27  Phos  2.9     02-14  Mg     1.9     -14    TPro  7.2  /  Alb  4.7  /  TBili  1.6<H>  /  DBili  x   /  AST  28  /  ALT  24  /  AlkPhos  51  02-13    PT/INR - ( 2018 17:17 )   PT: 10.4 SEC;   INR: 0.94          PTT - ( 2018 17:17 )  PTT:29.7 SEC      Urinalysis Basic - ( 2018 17:17 )    Color: PLYEL / Appearance: CLEAR / S.017 / pH: 6.5  Gluc: NEGATIVE / Ketone: TRACE  / Bili: NEGATIVE / Urobili: NORMAL mg/dL   Blood: NEGATIVE / Protein: NEGATIVE mg/dL / Nitrite: NEGATIVE   Leuk Esterase: TRACE / RBC: 5-10 / WBC 2-5   Sq Epi: OCC / Non Sq Epi: x / Bacteria: x        RADIOLOGY & ADDITIONAL TESTS:    Imaging Personally Reviewed:    Consultant(s) Notes Reviewed:      Care Discussed with Consultants/Other Providers:
Patient is a 89y old  Female who presents with a chief complaint of Right hip pain (2018 23:29)      SUBJECTIVE / OVERNIGHT EVENTS: patient seen and examined by bedside at 9;50 Am, c/o pain in lower back , denies headache, dizziness, SOB, CP, Palpitations N/V/D, abdominal pain        MEDICATIONS  (STANDING):  atorvastatin 20 milliGRAM(s) Oral at bedtime  calcium carbonate 1250 mG + Vitamin D (OsCal 500 + D) 1 Tablet(s) Oral daily  enoxaparin Injectable 40 milliGRAM(s) SubCutaneous every 24 hours  folic acid 1 milliGRAM(s) Oral daily  multivitamin 1 Tablet(s) Oral daily    MEDICATIONS  (PRN):  morphine  - Injectable 2 milliGRAM(s) IV Push every 4 hours PRN Moderate Pain (4 - 6)  traMADol 50 milliGRAM(s) Oral every 12 hours PRN Mild Pain (1 - 3)      Vital Signs Last 24 Hrs  T(C): 37.6 (2018 16:00), Max: 37.6 (2018 16:00)  T(F): 99.6 (2018 16:00), Max: 99.6 (2018 16:00)  HR: 84 (2018 16:00) (53 - 85)  BP: 132/76 (2018 16:00) (105/65 - 135/69)  BP(mean): --  RR: 18 (2018 16:00) (16 - 18)  SpO2: 96% (2018 16:00) (96% - 98%)  CAPILLARY BLOOD GLUCOSE        I&O's Summary    2018 07:01  -  2018 17:29  --------------------------------------------------------  IN: 130 mL / OUT: 200 mL / NET: -70 mL        PHYSICAL EXAM:  GENERAL: NAD, well-developed  HEAD:  Atraumatic, Normocephalic  EYES: EOMI, PERRLA, conjunctiva and sclera clear  NECK: Supple,   CHEST/LUNG: Clear to auscultation bilaterally; No wheeze  HEART: Regular rate and rhythm;  ABDOMEN: Soft, Nontender, Nondistended; Bowel sounds present  EXTREMITIES:  2+ Peripheral Pulses, No clubbing, cyanosis, or edema  PSYCH: AAOx3  NEUROLOGY: non-focal  SKIN: No rashes or lesions    LABS:                        13.2   9.66  )-----------( 189      ( 2018 06:27 )             39.0     02-14    141  |  102  |  20  ----------------------------<  107<H>  4.1   |  27  |  0.85    Ca    8.4      2018 06:27  Phos  2.9     02-14  Mg     1.9     02-14    TPro  7.2  /  Alb  4.7  /  TBili  1.6<H>  /  DBili  x   /  AST  28  /  ALT  24  /  AlkPhos  51  02-13    PT/INR - ( 2018 17:17 )   PT: 10.4 SEC;   INR: 0.94          PTT - ( 2018 17:17 )  PTT:29.7 SEC      Urinalysis Basic - ( 2018 17:17 )    Color: PLYEL / Appearance: CLEAR / S.017 / pH: 6.5  Gluc: NEGATIVE / Ketone: TRACE  / Bili: NEGATIVE / Urobili: NORMAL mg/dL   Blood: NEGATIVE / Protein: NEGATIVE mg/dL / Nitrite: NEGATIVE   Leuk Esterase: TRACE / RBC: 5-10 / WBC 2-5   Sq Epi: OCC / Non Sq Epi: x / Bacteria: x        RADIOLOGY & ADDITIONAL TESTS:    Imaging Personally Reviewed:    Consultant(s) Notes Reviewed:  ortho     Care Discussed with Consultants/Other Providers:

## 2018-02-16 NOTE — DISCHARGE NOTE ADULT - CARE PLAN
Principal Discharge DX:	Pubic ramus fracture, right, closed, initial encounter  Goal:	stable  Assessment and plan of treatment:	Continue to participate in physical therapy treatments at rehab.  Continue pain control medication for pain control.  You were evaluated by orthopaedic team during your admission.  Follow up with Dr. Salmeron as outpatient [id number] within 2 weeks of discharge  Secondary Diagnosis:	Age-related osteoporosis with current pathological fracture, initial encounter  Assessment and plan of treatment:	Continue boniva as you were previously  Secondary Diagnosis:	Hyperlipidemia, unspecified hyperlipidemia type  Assessment and plan of treatment:	Continue cholesterol control medications. Continue DASH diet. Follow up with your PCP within 1 week of discharge for further management and monitoring of lipid and cholesterol panels.  Secondary Diagnosis:	Arthritis  Assessment and plan of treatment:	Continue pain control - Continue safe ambulation

## 2018-02-16 NOTE — DISCHARGE NOTE ADULT - MEDICATION SUMMARY - MEDICATIONS TO TAKE
I will START or STAY ON the medications listed below when I get home from the hospital:    traMADol 50 mg oral tablet  -- 1 tab(s) by mouth every 12 hours, As needed, Mild Pain (1 - 3)  -- Indication: For Pain    atorvastatin 40 mg oral tablet  -- 1 tab(s) by mouth once a day  -- Indication: For Hyperlipidemia, unspecified hyperlipidemia type    Boniva  -- Indication: For Osteoporosis    docusate sodium 100 mg oral capsule  -- 1 cap(s) by mouth 3 times a day  -- Indication: For constipation    polyethylene glycol 3350 oral powder for reconstitution  -- 17 gram(s) by mouth once a day  -- Indication: For constipation    Calcium 600+D oral tablet  -- 1 tab(s) by mouth 2 times a day  -- Indication: For supplement    Multiple Vitamins oral tablet  -- 1 tab(s) by mouth once a day  -- Indication: For supplement    folic acid 1 mg oral tablet  -- 1 tab(s) by mouth once a day  -- Indication: For supplement

## 2018-02-16 NOTE — DISCHARGE NOTE ADULT - PLAN OF CARE
stable Continue to participate in physical therapy treatments at rehab.  Continue pain control medication for pain control.  You were evaluated by orthopaedic team during your admission.  Follow up with Dr. Salmeron as outpatient [id number] within 2 weeks of discharge Continue boniva as you were previously Continue cholesterol control medications. Continue DASH diet. Follow up with your PCP within 1 week of discharge for further management and monitoring of lipid and cholesterol panels. Continue pain control - Continue safe ambulation

## 2018-02-16 NOTE — PROGRESS NOTE ADULT - PROBLEM SELECTOR PROBLEM 2
Age-related osteoporosis with current pathological fracture, initial encounter

## 2018-02-16 NOTE — PROGRESS NOTE ADULT - PROBLEM SELECTOR PLAN 4
- Continue with home dose of atorvastatin  - Low cholesterol diet

## 2018-02-16 NOTE — DISCHARGE NOTE ADULT - HOSPITAL COURSE
89 yr old .female with hx of  OA and osteoporosis admitted  with pubic ramus fracture     Problem/Plan - 1:  ·  Problem: Pubic ramus fracture, right, closed, initial encounter.  Plan: - Pain control  - Fall risk precaution  - Fracture precaution  - Bed alarm  - PT evaluation noted , recommend rehab    - Orthopedic surgery consult appreciated, no surgical intervention, PT/OT, WBAT  -patient to follow up with Dr. Salmeron as outpatient [id number].      Problem/Plan - 2:  ·  Problem: Age-related osteoporosis with current pathological fracture, initial encounter.  Plan: - Outpatient follow up  -c/w oscal d.      Problem/Plan - 3:  ·  Problem: Arthritis.  Plan: - pain control as above  - Otherwise clinically stable.      Problem/Plan - 4:  ·  Problem: Hyperlipidemia, unspecified hyperlipidemia type.  Plan: - Continue with home dose of atorvastatin  - Low cholesterol diet.      Problem/Plan - 5:  ·  Problem: Prophylactic measure.  Plan: - Lovenox 40 mg s/c daily.     Attending Attestation:   Pt needs 3 night stay to be discharged to rehab  plan of care was discussed with daughter in the morning rounds and granddaughter (in the evenings rounds ) at bedside  DC planning to rehab in am.     dispo: rehab 89 yr old .female with hx of  OA and osteoporosis admitted  with pubic ramus fracture     Problem/Plan - 1:  ·  Problem: Pubic ramus fracture, right, closed, initial encounter.  Plan: - Pain control  - Fall risk precaution  - Fracture precaution  - Bed alarm  - PT evaluation noted , recommend rehab    - Orthopedic surgery consult appreciated, no surgical intervention, PT/OT, WBAT  -patient to follow up with Dr. Salmeron as outpatient [id number].      Problem/Plan - 2:  ·  Problem: Age-related osteoporosis with current pathological fracture, initial encounter.  Plan: - Outpatient follow up  -c/w oscal d.      Problem/Plan - 3:  ·  Problem: Arthritis.  Plan: - pain control as above  - Otherwise clinically stable.      Problem/Plan - 4:  ·  Problem: Hyperlipidemia, unspecified hyperlipidemia type.  Plan: - Continue with home dose of atorvastatin  - Low cholesterol diet.      Problem/Plan - 5:  ·  Problem: Prophylactic measure.  Plan: - Lovenox 40 mg s/c daily.        Patient hemodynamically stable for discharge to rehab  plan of care was discussed with patient and family

## 2018-02-16 NOTE — PROGRESS NOTE ADULT - PROBLEM SELECTOR PLAN 3
- pain control as above  - Otherwise clinically stable

## 2018-02-16 NOTE — PROGRESS NOTE ADULT - PROBLEM SELECTOR PLAN 1
- Pain control  - Fall risk precaution  - Fracture precaution  - PT evaluation noted , recommend rehab    - Orthopedic surgery consult appreciated, no surgical intervention, PT/OT, WBAT  -patient to follow up with Dr. Salmeron as outpatient [id number]

## 2018-02-16 NOTE — DISCHARGE NOTE ADULT - PATIENT PORTAL LINK FT
You can access the [facility] Patient Portal, offered by [facility], by registering with the following website: [email or web address]

## 2018-02-27 PROBLEM — M19.90 UNSPECIFIED OSTEOARTHRITIS, UNSPECIFIED SITE: Chronic | Status: ACTIVE | Noted: 2018-02-13

## 2018-02-27 PROBLEM — M81.0 AGE-RELATED OSTEOPOROSIS WITHOUT CURRENT PATHOLOGICAL FRACTURE: Chronic | Status: ACTIVE | Noted: 2018-02-13

## 2018-03-07 ENCOUNTER — APPOINTMENT (OUTPATIENT)
Dept: ORTHOPEDIC SURGERY | Facility: CLINIC | Age: 83
End: 2018-03-07
Payer: COMMERCIAL

## 2018-03-07 VITALS
HEART RATE: 85 BPM | DIASTOLIC BLOOD PRESSURE: 75 MMHG | WEIGHT: 107 LBS | HEIGHT: 57 IN | BODY MASS INDEX: 23.08 KG/M2 | SYSTOLIC BLOOD PRESSURE: 112 MMHG

## 2018-03-07 VITALS — HEIGHT: 69 IN | WEIGHT: 107 LBS | BODY MASS INDEX: 15.85 KG/M2

## 2018-03-07 PROCEDURE — 72190 X-RAY EXAM OF PELVIS: CPT

## 2018-03-07 PROCEDURE — 99214 OFFICE O/P EST MOD 30 MIN: CPT

## 2018-04-11 ENCOUNTER — APPOINTMENT (OUTPATIENT)
Dept: ORTHOPEDIC SURGERY | Facility: CLINIC | Age: 83
End: 2018-04-11
Payer: MEDICARE

## 2018-04-11 VITALS — WEIGHT: 107 LBS | HEIGHT: 57 IN | BODY MASS INDEX: 23.08 KG/M2

## 2018-04-11 DIAGNOSIS — S32.591A OTHER SPECIFIED FRACTURE OF RIGHT PUBIS, INITIAL ENCOUNTER FOR CLOSED FRACTURE: ICD-10-CM

## 2018-04-11 PROCEDURE — 72190 X-RAY EXAM OF PELVIS: CPT

## 2018-04-11 PROCEDURE — 99214 OFFICE O/P EST MOD 30 MIN: CPT

## 2022-05-25 NOTE — PROGRESS NOTE ADULT - ASSESSMENT
89 yr old .female with hx of  OA and osteoporosis admitted  with pubic ramus fracture
difficulty breathing

## 2022-07-03 NOTE — PATIENT PROFILE ADULT. - URINARY CATHETER
Patient came from [facility] ([facility]). [place] when walking in the facility, laceration to the back of head. 1 staple applied to the laceration in the ER. Patient is alert but sometimes confused. Hard of hearing and impaired vision. Stedy x1 for transfer. History of falls, subdural bleed, A.fib. Generalized weakness. Tylenol administered for pain. Sinus bradycardia, tele on. CT shows brain hemorrhage, follow up CT this morning. Neurosurgery consulted in the ER. This RN educate the patient to use call light, and not to get up without help. Safety precautions in place, bed in lowest position, bed alarm activated, call light in reach. The care plan and education has been reviewed and mutually agreed upon with the patient. no